# Patient Record
Sex: MALE | Race: WHITE | NOT HISPANIC OR LATINO | URBAN - METROPOLITAN AREA
[De-identification: names, ages, dates, MRNs, and addresses within clinical notes are randomized per-mention and may not be internally consistent; named-entity substitution may affect disease eponyms.]

---

## 2017-10-12 ENCOUNTER — APPOINTMENT (RX ONLY)
Dept: URBAN - METROPOLITAN AREA CLINIC 343 | Facility: CLINIC | Age: 67
Setting detail: DERMATOLOGY
End: 2017-10-12

## 2017-10-12 DIAGNOSIS — L72.0 EPIDERMAL CYST: ICD-10-CM

## 2017-10-12 DIAGNOSIS — B07.8 OTHER VIRAL WARTS: ICD-10-CM

## 2017-10-12 DIAGNOSIS — D22 MELANOCYTIC NEVI: ICD-10-CM

## 2017-10-12 DIAGNOSIS — L82.1 OTHER SEBORRHEIC KERATOSIS: ICD-10-CM

## 2017-10-12 DIAGNOSIS — L57.0 ACTINIC KERATOSIS: ICD-10-CM

## 2017-10-12 DIAGNOSIS — D18.0 HEMANGIOMA: ICD-10-CM

## 2017-10-12 PROBLEM — D18.01 HEMANGIOMA OF SKIN AND SUBCUTANEOUS TISSUE: Status: ACTIVE | Noted: 2017-10-12

## 2017-10-12 PROBLEM — I10 ESSENTIAL (PRIMARY) HYPERTENSION: Status: ACTIVE | Noted: 2017-10-12

## 2017-10-12 PROBLEM — D48.5 NEOPLASM OF UNCERTAIN BEHAVIOR OF SKIN: Status: ACTIVE | Noted: 2017-10-12

## 2017-10-12 PROBLEM — D22.5 MELANOCYTIC NEVI OF TRUNK: Status: ACTIVE | Noted: 2017-10-12

## 2017-10-12 PROCEDURE — ? REFERRAL CORRESPONDENCE

## 2017-10-12 PROCEDURE — ? COUNSELING

## 2017-10-12 PROCEDURE — 17110 DESTRUCTION B9 LES UP TO 14: CPT

## 2017-10-12 PROCEDURE — ? BIOPSY BY SHAVE METHOD

## 2017-10-12 PROCEDURE — 11100: CPT | Mod: 59

## 2017-10-12 PROCEDURE — ? LIQUID NITROGEN

## 2017-10-12 PROCEDURE — ? TREATMENT REGIMEN

## 2017-10-12 PROCEDURE — ? PRESCRIPTION

## 2017-10-12 PROCEDURE — 11101: CPT

## 2017-10-12 PROCEDURE — 99203 OFFICE O/P NEW LOW 30 MIN: CPT | Mod: 25

## 2017-10-12 RX ORDER — FLUOROURACIL 2 G/40G
CREAM TOPICAL BID
Qty: 1 | Refills: 0 | Status: ERX | COMMUNITY
Start: 2017-10-12

## 2017-10-12 RX ADMIN — FLUOROURACIL: 2 CREAM TOPICAL at 00:00

## 2017-10-12 ASSESSMENT — LOCATION SIMPLE DESCRIPTION DERM
LOCATION SIMPLE: LEFT UPPER BACK
LOCATION SIMPLE: RIGHT SHOULDER
LOCATION SIMPLE: LEFT SCALP
LOCATION SIMPLE: RIGHT KNEE
LOCATION SIMPLE: RIGHT UPPER BACK
LOCATION SIMPLE: UPPER BACK
LOCATION SIMPLE: CHEST

## 2017-10-12 ASSESSMENT — LOCATION DETAILED DESCRIPTION DERM
LOCATION DETAILED: LEFT MEDIAL FRONTAL SCALP
LOCATION DETAILED: RIGHT POSTERIOR SHOULDER
LOCATION DETAILED: RIGHT INFERIOR UPPER BACK
LOCATION DETAILED: RIGHT MID-UPPER BACK
LOCATION DETAILED: STERNUM
LOCATION DETAILED: SUPERIOR THORACIC SPINE
LOCATION DETAILED: RIGHT KNEE
LOCATION DETAILED: LEFT SUPERIOR LATERAL UPPER BACK

## 2017-10-12 ASSESSMENT — LOCATION ZONE DERM
LOCATION ZONE: TRUNK
LOCATION ZONE: SCALP
LOCATION ZONE: LEG
LOCATION ZONE: ARM

## 2017-10-12 NOTE — PROCEDURE: LIQUID NITROGEN
Medical Necessity Clause: This procedure was medically necessary because the lesions that were treated were:
Add 52 Modifier (Optional): no
Consent: The patient's consent was obtained including but not limited to risks of crusting, scabbing, blistering, scarring, darker or lighter pigmentary change, recurrence, incomplete removal and infection.
Medical Necessity Information: It is in your best interest to select a reason for this procedure from the list below. All of these items fulfill various CMS LCD requirements except the new and changing color options.
Number Of Freeze-Thaw Cycles: 1 freeze-thaw cycle
Render Post-Care Instructions In Note?: yes
Post-Care Instructions: I reviewed with the patient in detail post-care instructions. Patient is to wear sunprotection, and avoid picking at any of the treated lesions. Pt may apply Vaseline to crusted or scabbing areas.
Detail Level: Detailed

## 2017-10-12 NOTE — PROCEDURE: TREATMENT REGIMEN
Detail Level: Simple
Initiate Treatment: Patient will apply Efudex BID x 14 days to scalp and return to office in 2 weeks for evaluation

## 2017-10-12 NOTE — PROCEDURE: BIOPSY BY SHAVE METHOD
Body Location Override (Optional - Billing Will Still Be Based On Selected Body Map Location If Applicable): central chest

## 2017-10-26 ENCOUNTER — APPOINTMENT (RX ONLY)
Dept: URBAN - METROPOLITAN AREA CLINIC 343 | Facility: CLINIC | Age: 67
Setting detail: DERMATOLOGY
End: 2017-10-26

## 2017-10-26 DIAGNOSIS — L57.0 ACTINIC KERATOSIS: ICD-10-CM

## 2017-10-26 PROCEDURE — ? TREATMENT REGIMEN

## 2017-10-26 PROCEDURE — 99212 OFFICE O/P EST SF 10 MIN: CPT

## 2017-10-26 ASSESSMENT — LOCATION SIMPLE DESCRIPTION DERM: LOCATION SIMPLE: RIGHT SCALP

## 2017-10-26 ASSESSMENT — LOCATION ZONE DERM: LOCATION ZONE: SCALP

## 2017-10-26 ASSESSMENT — LOCATION DETAILED DESCRIPTION DERM: LOCATION DETAILED: RIGHT MEDIAL FRONTAL SCALP

## 2017-10-26 NOTE — PROCEDURE: TREATMENT REGIMEN
Plan: Patient is to contact the office if any problems occur for further evaluation and management. Will recheck in six weeks
Initiate Treatment: Applying moisturizer to scalp on a daily basis
Detail Level: Zone
Discontinue Regimen: Applying Efudex cream

## 2020-05-04 ENCOUNTER — NEW PATIENT COMPREHENSIVE (OUTPATIENT)
Dept: URBAN - METROPOLITAN AREA CLINIC 43 | Facility: CLINIC | Age: 70
End: 2020-05-04

## 2020-05-04 DIAGNOSIS — H25.9: ICD-10-CM

## 2020-05-04 DIAGNOSIS — H52.203: ICD-10-CM

## 2020-05-04 DIAGNOSIS — H43.813: ICD-10-CM

## 2020-05-04 PROCEDURE — 92004 COMPRE OPH EXAM NEW PT 1/>: CPT

## 2020-05-04 PROCEDURE — 92015 DETERMINE REFRACTIVE STATE: CPT

## 2020-05-04 ASSESSMENT — VISUAL ACUITY
OS_CC: J1
OD_SC: 20/25-3
OS_SC: 20/25-1
OD_SC: J12
OS_SC: J12
OD_CC: J1-

## 2020-05-04 ASSESSMENT — TONOMETRY
OS_IOP_MMHG: 10
OD_IOP_MMHG: 10

## 2021-03-15 ENCOUNTER — BULK ORDERING (OUTPATIENT)
Dept: CASE MANAGEMENT | Facility: OTHER | Age: 71
End: 2021-03-15

## 2021-03-15 DIAGNOSIS — Z23 IMMUNIZATION DUE: ICD-10-CM

## 2021-05-03 ENCOUNTER — ESTABLISHED COMPREHENSIVE EXAM (OUTPATIENT)
Dept: URBAN - METROPOLITAN AREA CLINIC 43 | Facility: CLINIC | Age: 71
End: 2021-05-03

## 2021-05-03 DIAGNOSIS — H52.203: ICD-10-CM

## 2021-05-03 DIAGNOSIS — H43.813: ICD-10-CM

## 2021-05-03 DIAGNOSIS — H25.9: ICD-10-CM

## 2021-05-03 PROCEDURE — 92015 DETERMINE REFRACTIVE STATE: CPT

## 2021-05-03 PROCEDURE — 92014 COMPRE OPH EXAM EST PT 1/>: CPT

## 2021-05-03 ASSESSMENT — TONOMETRY
OD_IOP_MMHG: 12
OS_IOP_MMHG: 12

## 2021-05-03 ASSESSMENT — VISUAL ACUITY
OD_CC: J1-
OS_CC: 20/25-1
OS_SC: J12
OS_CC: J1-
OS_SC: 20/30-2
OD_CC: 20/25-1
OD_SC: J10-
OD_SC: 20/30-2

## 2022-05-03 ENCOUNTER — COMPREHENSIVE EXAM (OUTPATIENT)
Dept: URBAN - METROPOLITAN AREA CLINIC 43 | Facility: CLINIC | Age: 72
End: 2022-05-03

## 2022-05-03 DIAGNOSIS — H52.203: ICD-10-CM

## 2022-05-03 PROCEDURE — 92014 COMPRE OPH EXAM EST PT 1/>: CPT

## 2022-05-03 PROCEDURE — 92015 DETERMINE REFRACTIVE STATE: CPT

## 2022-05-03 ASSESSMENT — VISUAL ACUITY
OU_SC: 20/30-2
OD_SC: 20/30-2
OS_BAT: 20/40
OD_CC: J1
OD_BAT: 20/50-2
OS_CC: J1
OS_SC: 20/40-1

## 2022-05-03 ASSESSMENT — TONOMETRY
OD_IOP_MMHG: 11
OS_IOP_MMHG: 13

## 2023-05-05 ENCOUNTER — COMPREHENSIVE EXAM (OUTPATIENT)
Dept: URBAN - METROPOLITAN AREA CLINIC 43 | Facility: CLINIC | Age: 73
End: 2023-05-05

## 2023-05-05 DIAGNOSIS — H52.203: ICD-10-CM

## 2023-05-05 DIAGNOSIS — H43.813: ICD-10-CM

## 2023-05-05 DIAGNOSIS — H25.9: ICD-10-CM

## 2023-05-05 PROCEDURE — 92015 DETERMINE REFRACTIVE STATE: CPT

## 2023-05-05 PROCEDURE — 92014 COMPRE OPH EXAM EST PT 1/>: CPT

## 2023-05-05 ASSESSMENT — VISUAL ACUITY
OU_SC: 20/25-2
OS_CC: 20/25+2
OD_SC: 20/30-2
OS_CC: J1
OD_CC: 20/25-2
OS_SC: J8
OU_CC: 20/20
OS_SC: 20/30
OD_SC: J10
OD_CC: J1
OU_CC: J1
OU_SC: J6

## 2023-05-05 ASSESSMENT — TONOMETRY
OD_IOP_MMHG: 13
OS_IOP_MMHG: 13

## 2024-11-22 ENCOUNTER — COMPREHENSIVE EXAM (OUTPATIENT)
Dept: URBAN - METROPOLITAN AREA CLINIC 43 | Facility: CLINIC | Age: 74
End: 2024-11-22

## 2024-11-22 DIAGNOSIS — H25.9: ICD-10-CM

## 2024-11-22 DIAGNOSIS — H43.813: ICD-10-CM

## 2024-11-22 DIAGNOSIS — H52.203: ICD-10-CM

## 2024-11-22 DIAGNOSIS — H04.123: ICD-10-CM

## 2024-11-22 PROCEDURE — 92014 COMPRE OPH EXAM EST PT 1/>: CPT

## 2024-11-22 PROCEDURE — 92015 DETERMINE REFRACTIVE STATE: CPT
